# Patient Record
Sex: MALE | Race: WHITE | URBAN - METROPOLITAN AREA
[De-identification: names, ages, dates, MRNs, and addresses within clinical notes are randomized per-mention and may not be internally consistent; named-entity substitution may affect disease eponyms.]

---

## 2021-07-01 ENCOUNTER — OFFICE VISIT (OUTPATIENT)
Dept: URBAN - METROPOLITAN AREA CLINIC 76 | Facility: CLINIC | Age: 66
End: 2021-07-01
Payer: COMMERCIAL

## 2021-07-01 DIAGNOSIS — H52.4 PRESBYOPIA: Primary | ICD-10-CM

## 2021-07-01 DIAGNOSIS — H25.13 AGE-RELATED NUCLEAR CATARACT, BILATERAL: ICD-10-CM

## 2021-07-01 DIAGNOSIS — H04.123 DRY EYE SYNDROME OF BILATERAL LACRIMAL GLANDS: ICD-10-CM

## 2021-07-01 PROCEDURE — 92004 COMPRE OPH EXAM NEW PT 1/>: CPT | Performed by: OPTOMETRIST

## 2021-07-01 ASSESSMENT — INTRAOCULAR PRESSURE
OS: 16
OD: 16

## 2021-07-01 ASSESSMENT — KERATOMETRY
OS: 43.50
OD: 43.13

## 2021-07-01 ASSESSMENT — VISUAL ACUITY
OS: 20/30
OD: 20/25

## 2021-07-01 NOTE — IMPRESSION/PLAN
Impression: Dry eye syndrome of bilateral lacrimal glands: H04.123. Bilateral. Plan: Discussed diagnosis in detail with patient. Warm compresses with lid massage from top / down, bottom / up, and sweep from inside / out x 2. Patient instructed to use emulsive base lubricant 3-4 x a day. Increase omega foods/nuts and/or Borage/Fish/Krill oil supplement 1500-2500mg capsule orally per day.

## 2022-09-01 ENCOUNTER — OFFICE VISIT (OUTPATIENT)
Dept: URBAN - METROPOLITAN AREA CLINIC 76 | Facility: CLINIC | Age: 67
End: 2022-09-01
Payer: MEDICARE

## 2022-09-01 DIAGNOSIS — H52.4 PRESBYOPIA: ICD-10-CM

## 2022-09-01 DIAGNOSIS — H04.123 DRY EYE SYNDROME OF BILATERAL LACRIMAL GLANDS: ICD-10-CM

## 2022-09-01 DIAGNOSIS — H25.13 AGE-RELATED NUCLEAR CATARACT, BILATERAL: Primary | ICD-10-CM

## 2022-09-01 DIAGNOSIS — H40.013 OPEN ANGLE WITH BORDERLINE FINDINGS, LOW RISK, BILATERAL: ICD-10-CM

## 2022-09-01 PROCEDURE — 99214 OFFICE O/P EST MOD 30 MIN: CPT | Performed by: OPTOMETRIST

## 2022-09-01 ASSESSMENT — KERATOMETRY
OS: 43.50
OD: 43.13

## 2022-09-01 ASSESSMENT — INTRAOCULAR PRESSURE
OS: 19
OD: 16

## 2022-09-01 ASSESSMENT — VISUAL ACUITY
OS: 20/20
OD: 20/20

## 2022-09-01 NOTE — IMPRESSION/PLAN
Impression: Open angle with borderline findings, low risk, bilateral: H40.013. Bilateral. asymmetrical ON Plan: Discussed diagnosis in detail with patient. Recommend baseline glaucoma testing. VF, OCT, [place].

## 2022-09-01 NOTE — IMPRESSION/PLAN
Impression: Dry eye syndrome of bilateral lacrimal glands: H04.123 Bilateral. Plan: Discussed diagnosis in detail with patient. Warm compresses with lid massage from top / down, bottom / up, and sweep from inside / out x 2. Patient instructed to use emulsive base lubricant 3-4 x a day. Increase omega foods/nuts and/or Borage/Fish/Krill/Primrose oil supplement 1500-2500mg capsule orally per day.

## 2022-09-30 ENCOUNTER — OFFICE VISIT (OUTPATIENT)
Dept: URBAN - METROPOLITAN AREA CLINIC 76 | Facility: CLINIC | Age: 67
End: 2022-09-30
Payer: MEDICARE

## 2022-09-30 DIAGNOSIS — H40.013 OPEN ANGLE WITH BORDERLINE FINDINGS, LOW RISK, BILATERAL: Primary | ICD-10-CM

## 2022-09-30 PROCEDURE — 92083 EXTENDED VISUAL FIELD XM: CPT | Performed by: OPTOMETRIST

## 2022-09-30 PROCEDURE — 76514 ECHO EXAM OF EYE THICKNESS: CPT | Performed by: OPTOMETRIST

## 2022-09-30 PROCEDURE — 99213 OFFICE O/P EST LOW 20 MIN: CPT | Performed by: OPTOMETRIST

## 2022-09-30 PROCEDURE — 92133 CPTRZD OPH DX IMG PST SGM ON: CPT | Performed by: OPTOMETRIST

## 2022-09-30 RX ORDER — LATANOPROST 50 UG/ML
0.005 % SOLUTION OPHTHALMIC
Qty: 7.5 | Refills: 1 | Status: ACTIVE
Start: 2022-09-30

## 2022-09-30 ASSESSMENT — KERATOMETRY
OS: 43.75
OD: 43.25

## 2022-09-30 ASSESSMENT — INTRAOCULAR PRESSURE
OD: 21
OS: 21

## 2022-09-30 NOTE — IMPRESSION/PLAN
Impression: Open angle with borderline findings, low risk, bilateral: H40.013. Preformed VF today WNL/stable, OU. OCT performed today, Mild NFL thinning inferior>superior OU. Pt denies sleeping issues/sleep apnea. IOP high today. Plan: Apply pressure to tear ducts after instilling drops, then close eyes and roll eyes around for 10 sec. Wait 5 min between drops.  Start Latanoprost QHS OU

## 2022-12-30 ENCOUNTER — OFFICE VISIT (OUTPATIENT)
Dept: URBAN - METROPOLITAN AREA CLINIC 76 | Facility: CLINIC | Age: 67
End: 2022-12-30
Payer: MEDICARE

## 2022-12-30 DIAGNOSIS — H40.013 OPEN ANGLE WITH BORDERLINE FINDINGS, LOW RISK, BILATERAL: Primary | ICD-10-CM

## 2022-12-30 DIAGNOSIS — H04.123 DRY EYE SYNDROME OF BILATERAL LACRIMAL GLANDS: ICD-10-CM

## 2022-12-30 PROCEDURE — 99213 OFFICE O/P EST LOW 20 MIN: CPT | Performed by: OPTOMETRIST

## 2022-12-30 RX ORDER — LATANOPROST 50 UG/ML
0.005 % SOLUTION OPHTHALMIC
Qty: 7.5 | Refills: 3 | Status: ACTIVE
Start: 2022-12-30

## 2022-12-30 ASSESSMENT — INTRAOCULAR PRESSURE
OS: 22
OD: 18

## 2022-12-30 NOTE — IMPRESSION/PLAN
Impression: Open angle with borderline findings, low risk, bilateral: H40.013. IOP higher today OS > OD. Pt denies sleeping issues/sleep apnea. -9/30/22 VF: WNL/stable, OU. 
-9/30/22 OCT: Mild NFL thinning inferior>superior OU. Plan: Discussed with patient. Continue Latanoprost QHS OU. Apply pressure to tear ducts after instilling drops, then close eyes and roll eyes around for 10 sec. Wait 5 min between drops.

## 2023-04-24 ENCOUNTER — OFFICE VISIT (OUTPATIENT)
Dept: URBAN - METROPOLITAN AREA CLINIC 76 | Facility: CLINIC | Age: 68
End: 2023-04-24
Payer: MEDICARE

## 2023-04-24 DIAGNOSIS — H25.13 AGE-RELATED NUCLEAR CATARACT, BILATERAL: ICD-10-CM

## 2023-04-24 DIAGNOSIS — H40.013 OPEN ANGLE WITH BORDERLINE FINDINGS, LOW RISK, BILATERAL: Primary | ICD-10-CM

## 2023-04-24 PROCEDURE — 99213 OFFICE O/P EST LOW 20 MIN: CPT | Performed by: OPTOMETRIST

## 2023-04-24 ASSESSMENT — INTRAOCULAR PRESSURE
OD: 20
OS: 24

## 2023-04-24 NOTE — IMPRESSION/PLAN
Impression: Open angle with borderline findings, low risk, bilateral: H40.013 Bilateral.
IOP still too high today on Latanoprost.
TARGET IOP >21 OU Plan: Discussed diagnosis in detail with patient. D/C Latanoprost, start Lumigan QHS OU to see if IOP comes down. Sample Lumigan given, not sent to pharmacy yet.

## 2023-05-17 ENCOUNTER — OFFICE VISIT (OUTPATIENT)
Dept: URBAN - METROPOLITAN AREA CLINIC 76 | Facility: CLINIC | Age: 68
End: 2023-05-17
Payer: MEDICARE

## 2023-05-17 DIAGNOSIS — H40.033 ANATOMICAL NARROW ANGLE, BILATERAL: ICD-10-CM

## 2023-05-17 DIAGNOSIS — H40.1131 PRIMARY OPEN-ANGLE GLAUCOMA, BILATERAL, MILD STAGE: Primary | ICD-10-CM

## 2023-05-17 DIAGNOSIS — H25.13 AGE-RELATED NUCLEAR CATARACT, BILATERAL: ICD-10-CM

## 2023-05-17 PROCEDURE — 92132 CPTRZD OPH DX IMG ANT SGM: CPT | Performed by: OPTOMETRIST

## 2023-05-17 PROCEDURE — 99213 OFFICE O/P EST LOW 20 MIN: CPT | Performed by: OPTOMETRIST

## 2023-05-17 RX ORDER — BRIMONIDINE TARTRATE 2 MG/ML
0.2 % SOLUTION/ DROPS OPHTHALMIC
Qty: 5 | Refills: 2 | Status: INACTIVE
Start: 2023-05-17 | End: 2023-05-18

## 2023-05-17 ASSESSMENT — INTRAOCULAR PRESSURE
OD: 22
OS: 22

## 2023-05-17 NOTE — IMPRESSION/PLAN
Impression: Anatomical narrow angle, bilateral: H40.033. Bilateral. Performed and reviewed OCT for angles. Narrow angle OD > OS. Plan: Discussed with patient. Recommend LPI consult with Dr. Molly Hammans. Pt to call with any concerns.

## 2023-05-17 NOTE — IMPRESSION/PLAN
Impression: Primary open-angle glaucoma, bilateral, mild stage: H40.1131. Bilateral. Latanoprost and lumigan ineffective. Hx of COPD and A-fib, no timolol. IOP still too high today with Lumigan. IOP goal: 18 or less OU. Plan: Discussed with patient. D/c Davidigan. Start Brimonidine BID OU. Sent to pharmacy. Apply pressure to tear ducts after instilling drops, then close eyes and roll eyes around for 10 sec. Wait 5 min between drops. Consider glaucoma consult at next visit if IOP is not lower. Repeat VF/OCT 9/2024.